# Patient Record
Sex: FEMALE | URBAN - METROPOLITAN AREA
[De-identification: names, ages, dates, MRNs, and addresses within clinical notes are randomized per-mention and may not be internally consistent; named-entity substitution may affect disease eponyms.]

---

## 2021-11-01 ENCOUNTER — APPOINTMENT (RX ONLY)
Dept: URBAN - METROPOLITAN AREA CLINIC 23 | Facility: CLINIC | Age: 76
Setting detail: DERMATOLOGY
End: 2021-11-01

## 2021-11-01 DIAGNOSIS — Z41.9 ENCOUNTER FOR PROCEDURE FOR PURPOSES OTHER THAN REMEDYING HEALTH STATE, UNSPECIFIED: ICD-10-CM

## 2021-11-01 PROCEDURE — ? RECOMMENDATIONS

## 2021-11-01 PROCEDURE — ? IN-HOUSE DISPENSING PHARMACY

## 2021-11-01 NOTE — PROCEDURE: IN-HOUSE DISPENSING PHARMACY
Name Of Product 7: Latisse 5ml
Product 23 Units Dispensed: 0
Product 18 Unit Type: mg
Product 13 Amount/Unit (Numbers Only): 1
Product 2 Application Directions: apply to affected areas left lower leg am and pm  as indicated
Product 11 Application Directions: Apply a thin layer to the acne prone areas in the AM
Product 14 Application Directions: Apply to the under eyes in the Am and pm
Product 11 Unit Type: bottle(s)
Product 2 Unit Type: tablets
Name Of Product 19: Diazepam 5mg
Name Of Product 3: Tretinoin 0.05% cream
Name Of Product 12: Retinol Lite
Product 14 Unit Type: tube(s)
Product 7 Application Directions: Apply to lashes at bedtime daily as indicated
Product 1 Price/Unit (In Dollars): 0.00
Product 7 Unit Type: ml
Name Of Product 8: TNS Essential
Product 3 Application Directions: Apply a pea size amount to the entire face at night
Product 3 Amount/Unit (Numbers Only): [facility]
Name Of Product 6: Prednisone 20mg
Product 10 Application Directions: Take 1 tablet 2 times daily for 3 days
Product 1 Application Directions: Apply thin layer to right cheek every night  at bedtime only.
Product 22 Application Directions: Apply to the affected area of the face
Name Of Product 2: Valtrex
Name Of Product 11: Clearing gel
Product 6 Application Directions: Take one tablet for swelling today at the office as indicated and one tablet tomorrow
Send Charges To Patient Encounter: No
Name Of Product 21: Valium 5mg
Product 2 Amount/Unit (Numbers Only): 6
Name Of Product 5: loratadine 10 mg
Name Of Product 14: Skin Better Interfuse eye treatment cream
Product 9 Application Directions: Apply to the acne prone areas in the Am and Pm
Product 21 Application Directions: Take one tablet 30 minutes prior to procedure
Product 9 Unit Type: jar(s)
Product 5 Application Directions: Take one tablet today after procedure with full glass of water as indicated
Product 7 Amount/Unit (Numbers Only): 5
Name Of Product 1: Hydroquinone 4% / Tretinoin 0.05% / Fluocinolone 0.01%
Name Of Product 10: Valtrex 500 mg
Name Of Product 22: Brightening Pads
Product 12 Application Directions: Apply a pea size amount to the entire face at bedtime.
Product 3 Unit Type: grams
Name Of Product 13: Skin Better Even tone correcting serum
Name Of Product 4: Valium 5 mg
Product 8 Application Directions: Apply to the clean face in the AM and PM daily
Detail Level: Zone
Product 4 Application Directions: Take one tablet half hour today prior to procedure as indicated.  Dispense in office
Product 13 Application Directions: Apply to the entire face in the Am and pm
Name Of Product 9: Clearing tone pads